# Patient Record
Sex: MALE | Race: BLACK OR AFRICAN AMERICAN | Employment: FULL TIME | URBAN - METROPOLITAN AREA
[De-identification: names, ages, dates, MRNs, and addresses within clinical notes are randomized per-mention and may not be internally consistent; named-entity substitution may affect disease eponyms.]

---

## 2018-09-24 ENCOUNTER — HOSPITAL ENCOUNTER (OUTPATIENT)
Dept: PREADMISSION TESTING | Age: 58
Discharge: HOME | End: 2018-09-24
Payer: COMMERCIAL

## 2018-09-24 VITALS — HEIGHT: 68 IN | BODY MASS INDEX: 25.01 KG/M2 | WEIGHT: 165 LBS

## 2018-09-24 LAB
ANION GAP SERPL CALC-SCNC: 5 MMOL/L (ref 3–18)
ATRIAL RATE: 55 BPM
BUN SERPL-MCNC: 13 MG/DL (ref 7–18)
BUN/CREAT SERPL: 13 (ref 12–20)
CALCIUM SERPL-MCNC: 9.3 MG/DL (ref 8.5–10.1)
CALCULATED P AXIS, ECG09: 66 DEGREES
CALCULATED R AXIS, ECG10: 79 DEGREES
CALCULATED T AXIS, ECG11: 61 DEGREES
CHLORIDE SERPL-SCNC: 103 MMOL/L (ref 100–108)
CO2 SERPL-SCNC: 33 MMOL/L (ref 21–32)
CREAT SERPL-MCNC: 1 MG/DL (ref 0.6–1.3)
DIAGNOSIS, 93000: NORMAL
GLUCOSE SERPL-MCNC: 88 MG/DL (ref 74–99)
HCT VFR BLD AUTO: 43.1 % (ref 36–48)
HGB BLD-MCNC: 13.4 G/DL (ref 13–16)
P-R INTERVAL, ECG05: 160 MS
POTASSIUM SERPL-SCNC: 4.4 MMOL/L (ref 3.5–5.5)
Q-T INTERVAL, ECG07: 416 MS
QRS DURATION, ECG06: 86 MS
QTC CALCULATION (BEZET), ECG08: 397 MS
SODIUM SERPL-SCNC: 141 MMOL/L (ref 136–145)
VENTRICULAR RATE, ECG03: 55 BPM

## 2018-09-24 PROCEDURE — 80048 BASIC METABOLIC PNL TOTAL CA: CPT | Performed by: UROLOGY

## 2018-09-24 PROCEDURE — 93005 ELECTROCARDIOGRAM TRACING: CPT

## 2018-09-24 PROCEDURE — 85018 HEMOGLOBIN: CPT | Performed by: UROLOGY

## 2018-09-24 RX ORDER — LISINOPRIL 20 MG/1
20 TABLET ORAL DAILY
COMMUNITY

## 2018-09-24 RX ORDER — CHOLECALCIFEROL TAB 125 MCG (5000 UNIT) 125 MCG
5000 TAB ORAL DAILY
COMMUNITY

## 2018-09-24 RX ORDER — CIPROFLOXACIN 2 MG/ML
400 INJECTION, SOLUTION INTRAVENOUS ONCE
Status: CANCELLED | OUTPATIENT
Start: 2018-09-24 | End: 2018-09-24

## 2018-09-24 RX ORDER — SODIUM CHLORIDE, SODIUM LACTATE, POTASSIUM CHLORIDE, CALCIUM CHLORIDE 600; 310; 30; 20 MG/100ML; MG/100ML; MG/100ML; MG/100ML
125 INJECTION, SOLUTION INTRAVENOUS CONTINUOUS
Status: CANCELLED | OUTPATIENT
Start: 2018-09-24

## 2018-09-24 RX ORDER — ATORVASTATIN CALCIUM 10 MG/1
10 TABLET, FILM COATED ORAL DAILY
COMMUNITY

## 2018-09-24 RX ORDER — AMLODIPINE BESYLATE 10 MG/1
10 TABLET ORAL DAILY
COMMUNITY

## 2018-09-24 RX ORDER — CARVEDILOL 25 MG/1
25 TABLET ORAL 2 TIMES DAILY WITH MEALS
COMMUNITY

## 2018-10-26 ENCOUNTER — HOSPITAL ENCOUNTER (OUTPATIENT)
Dept: PREADMISSION TESTING | Age: 58
Discharge: HOME | End: 2018-10-26
Payer: COMMERCIAL

## 2018-10-26 LAB
ANION GAP SERPL CALC-SCNC: 7 MMOL/L (ref 3–18)
BUN SERPL-MCNC: 13 MG/DL (ref 7–18)
BUN/CREAT SERPL: 13
CALCIUM SERPL-MCNC: 8.9 MG/DL (ref 8.5–10.1)
CHLORIDE SERPL-SCNC: 105 MMOL/L (ref 100–108)
CO2 SERPL-SCNC: 31 MMOL/L (ref 21–32)
CREAT SERPL-MCNC: 0.97 MG/DL (ref 0.6–1.3)
GLUCOSE SERPL-MCNC: 90 MG/DL (ref 74–99)
HCT VFR BLD AUTO: 43.3 % (ref 36–48)
HGB BLD-MCNC: 13.6 G/DL (ref 13–16)
POTASSIUM SERPL-SCNC: 4 MMOL/L (ref 3.5–5.5)
SODIUM SERPL-SCNC: 143 MMOL/L (ref 136–145)

## 2018-10-26 PROCEDURE — 80048 BASIC METABOLIC PNL TOTAL CA: CPT | Performed by: ANESTHESIOLOGY

## 2018-10-26 PROCEDURE — 36415 COLL VENOUS BLD VENIPUNCTURE: CPT | Performed by: ANESTHESIOLOGY

## 2018-10-26 PROCEDURE — 85018 HEMOGLOBIN: CPT | Performed by: ANESTHESIOLOGY

## 2018-10-29 PROBLEM — R97.20 ELEVATED PSA: Status: ACTIVE | Noted: 2018-10-29

## 2018-10-30 ENCOUNTER — ANESTHESIA (OUTPATIENT)
Dept: SURGERY | Age: 58
End: 2018-10-30
Payer: COMMERCIAL

## 2018-10-30 ENCOUNTER — ANESTHESIA EVENT (OUTPATIENT)
Dept: SURGERY | Age: 58
End: 2018-10-30
Payer: COMMERCIAL

## 2018-10-30 ENCOUNTER — HOSPITAL ENCOUNTER (OUTPATIENT)
Dept: ULTRASOUND IMAGING | Age: 58
Discharge: HOME | End: 2018-10-30
Attending: UROLOGY
Payer: COMMERCIAL

## 2018-10-30 ENCOUNTER — HOSPITAL ENCOUNTER (OUTPATIENT)
Age: 58
Setting detail: OUTPATIENT SURGERY
Discharge: HOME | End: 2018-10-30
Attending: UROLOGY | Admitting: UROLOGY
Payer: COMMERCIAL

## 2018-10-30 VITALS
DIASTOLIC BLOOD PRESSURE: 72 MMHG | BODY MASS INDEX: 24.32 KG/M2 | WEIGHT: 160.5 LBS | SYSTOLIC BLOOD PRESSURE: 133 MMHG | HEART RATE: 74 BPM | RESPIRATION RATE: 16 BRPM | OXYGEN SATURATION: 100 % | TEMPERATURE: 97.3 F | HEIGHT: 68 IN

## 2018-10-30 DIAGNOSIS — R97.20 ELEVATED PSA: ICD-10-CM

## 2018-10-30 PROCEDURE — 77030003481 HC NDL BIOP GUN BARD -B: Performed by: UROLOGY

## 2018-10-30 PROCEDURE — 74011000250 HC RX REV CODE- 250

## 2018-10-30 PROCEDURE — 76010000154 HC OR TIME FIRST 0.5 HR: Performed by: UROLOGY

## 2018-10-30 PROCEDURE — 77030013688 US GUIDE NDL PLC

## 2018-10-30 PROCEDURE — 77030020782 HC GWN BAIR PAWS FLX 3M -B: Performed by: UROLOGY

## 2018-10-30 PROCEDURE — 74011250636 HC RX REV CODE- 250/636: Performed by: UROLOGY

## 2018-10-30 PROCEDURE — 77030018836 HC SOL IRR NACL ICUM -A: Performed by: UROLOGY

## 2018-10-30 PROCEDURE — 76210000021 HC REC RM PH II 0.5 TO 1 HR: Performed by: UROLOGY

## 2018-10-30 PROCEDURE — 74011250636 HC RX REV CODE- 250/636

## 2018-10-30 PROCEDURE — 76060000031 HC ANESTHESIA FIRST 0.5 HR: Performed by: UROLOGY

## 2018-10-30 PROCEDURE — G0416 PROSTATE BIOPSY, ANY MTHD: HCPCS | Performed by: UROLOGY

## 2018-10-30 PROCEDURE — 74011250637 HC RX REV CODE- 250/637: Performed by: ANESTHESIOLOGY

## 2018-10-30 PROCEDURE — 77030032490 HC SLV COMPR SCD KNE COVD -B: Performed by: UROLOGY

## 2018-10-30 RX ORDER — PROPOFOL 10 MG/ML
INJECTION, EMULSION INTRAVENOUS AS NEEDED
Status: DISCONTINUED | OUTPATIENT
Start: 2018-10-30 | End: 2018-10-30 | Stop reason: HOSPADM

## 2018-10-30 RX ORDER — LIDOCAINE HYDROCHLORIDE 20 MG/ML
INJECTION, SOLUTION EPIDURAL; INFILTRATION; INTRACAUDAL; PERINEURAL AS NEEDED
Status: DISCONTINUED | OUTPATIENT
Start: 2018-10-30 | End: 2018-10-30 | Stop reason: HOSPADM

## 2018-10-30 RX ORDER — CIPROFLOXACIN 2 MG/ML
400 INJECTION, SOLUTION INTRAVENOUS ONCE
Status: DISCONTINUED | OUTPATIENT
Start: 2018-10-30 | End: 2018-10-30

## 2018-10-30 RX ORDER — KETAMINE HYDROCHLORIDE 10 MG/ML
INJECTION, SOLUTION INTRAMUSCULAR; INTRAVENOUS AS NEEDED
Status: DISCONTINUED | OUTPATIENT
Start: 2018-10-30 | End: 2018-10-30 | Stop reason: HOSPADM

## 2018-10-30 RX ORDER — CARVEDILOL 12.5 MG/1
25 TABLET ORAL ONCE
Status: COMPLETED | OUTPATIENT
Start: 2018-10-30 | End: 2018-10-30

## 2018-10-30 RX ORDER — ONDANSETRON 2 MG/ML
INJECTION INTRAMUSCULAR; INTRAVENOUS AS NEEDED
Status: DISCONTINUED | OUTPATIENT
Start: 2018-10-30 | End: 2018-10-30 | Stop reason: HOSPADM

## 2018-10-30 RX ORDER — CIPROFLOXACIN 2 MG/ML
400 INJECTION, SOLUTION INTRAVENOUS ONCE
Status: COMPLETED | OUTPATIENT
Start: 2018-10-30 | End: 2018-10-30

## 2018-10-30 RX ORDER — SODIUM CHLORIDE, SODIUM LACTATE, POTASSIUM CHLORIDE, CALCIUM CHLORIDE 600; 310; 30; 20 MG/100ML; MG/100ML; MG/100ML; MG/100ML
125 INJECTION, SOLUTION INTRAVENOUS CONTINUOUS
Status: DISCONTINUED | OUTPATIENT
Start: 2018-10-30 | End: 2018-10-30 | Stop reason: HOSPADM

## 2018-10-30 RX ADMIN — CARVEDILOL 25 MG: 12.5 TABLET, FILM COATED ORAL at 11:01

## 2018-10-30 RX ADMIN — SODIUM CHLORIDE, SODIUM LACTATE, POTASSIUM CHLORIDE, AND CALCIUM CHLORIDE 125 ML/HR: 600; 310; 30; 20 INJECTION, SOLUTION INTRAVENOUS at 09:40

## 2018-10-30 RX ADMIN — LIDOCAINE HYDROCHLORIDE 80 MG: 20 INJECTION, SOLUTION EPIDURAL; INFILTRATION; INTRACAUDAL; PERINEURAL at 12:01

## 2018-10-30 RX ADMIN — PROPOFOL 50 MG: 10 INJECTION, EMULSION INTRAVENOUS at 12:01

## 2018-10-30 RX ADMIN — ONDANSETRON 4 MG: 2 INJECTION INTRAMUSCULAR; INTRAVENOUS at 12:01

## 2018-10-30 RX ADMIN — KETAMINE HYDROCHLORIDE 30 MG: 10 INJECTION, SOLUTION INTRAMUSCULAR; INTRAVENOUS at 12:01

## 2018-10-30 RX ADMIN — SODIUM CHLORIDE, SODIUM LACTATE, POTASSIUM CHLORIDE, AND CALCIUM CHLORIDE: 600; 310; 30; 20 INJECTION, SOLUTION INTRAVENOUS at 11:51

## 2018-10-30 RX ADMIN — CIPROFLOXACIN 400 MG: 2 INJECTION INTRAVENOUS at 11:56

## 2018-10-30 NOTE — OP NOTES
PREOPERATIVE DIAGNOSIS:  Elevated prostate-specific antigen. POSTOPERATIVE DIAGNOSIS:  Elevated prostate-specific antigen. PROCEDURES PERFORMED:  Transrectal ultrasound, ultrasound needle guidance,  and prostate biopsy. SURGEON:  MICHAEL Delgadillo:  None      ANESTHESIA:  MAC      ESTIMATED BLOOD LOSS:  Minimal.      SPECIMENS REMOVED:  Right apex, right base, right mid, left apex, left  base, and left mid of prostate. FINDINGS:  Very heterogeneous prostate. There was a 75.25 mL in size prostate. COMPLICATIONS:  None. IMPLANTS:  None. CLINICAL NOTE:  The patient is a very pleasant 59year-old gentleman with an elevated PSA. DESCRIPTION OF PROCEDURE:  Preoperatively, the risks and benefits of  surgery were described to the patient. The risks include, but are not  limited to, bleeding, infection, and possible need for a future procedure. The patient assumed the risks and signed the informed consent. The patient  was taken to the operating room and placed on the OR table in supine  position. He was administered a MAC anesthetic. He was administered  intravenous antibiotics. He was placed in dorsolithotomy position. A  transrectal ultrasound probe was then inserted transanally without  difficulty into the rectum. Ultrasound pictures were taken of the prostate  in transverse and longitudinal views. The prostate was measured using the  standard Eliptoid formula and was found to be 75.25 mL in size. The  ultrasound views were obtained of the seminal vesicles, which were normal;  the transition zone, which was normal; the right periprosthetic tissue,  which was normal; and the left periprosthetic tissue, which was normal.  The left apex, mid and base, showed no hypoechoic lesions or areas of  abnormality. At this point, using ultrasound guidance, biopsies were taken. At the left base, 2 biopsies were taken. At the left mid 2 biopsies were taken.    At the left apex 2 were taken. Then, 2 biopsies were taken at the  right base, 2 biopsies were taken at the right mid, and 2 biopsies  taken at the right apex. At this point, the ultrasound probe was removed. There was no significant bleeding. The patient was taken out of lithotomy  position, revived from anesthesia, and taken to recovery in stable condition.       Enmanuel Morley MD

## 2018-10-30 NOTE — H&P
Urology [address]  [address] Tel: [phone] Fax: [phone] Patient: Caity Velazquez YOB: 1960 Assessment/Plan # Detail Type Description 1. Assessment Elevated prostate specific antigen [PSA] (R97.20). Patient Plan He has a high and rising PSA. We again discussed that this could represent a prostate cancer and that, by definition he could have a intermediate risk cancer b/c his PSA is over 10. We discussed the only way to know is with prostate bx. He will proceed. Risk of bleeding, infection and possible need for future bx were discussed. He elects to do it in the OR. Rx for Cipro was given. This 59year old male presents for Elevated PSA. History of Present Illness: 1. Elevated PSA The onset was approximately 2 years ago. The patient's PSA has risen since last monitored. Severity level is described as moderate. Reviewed today was a PSA taken on 11/04/2016 with findings of 5.72 ng/mL. PSA taken on 08/16/2016 with findings of 11.4 ng/mL. PSA taken on 01/27/2017 with findings of 6.47 ng/mL. PSA taken on 09/04/2018 with findings of 13.3 ng/mL. Pertinent history includes age over 48. Associated symptoms include slow stream and urinary frequency. Pertinent negatives include dysuria, hematospermia, hematuria, incomplete emptying, urinary incontinence and nocturia. Additional information: no recent UTI, the patient is sexually active,  He has some mld urinary frequency that is unchanged. He has eliminated all confounding variables on the last 3 draws. he was supposed to have a biopsy in 2017 and but cancelled his appt. Gloria Presley PAST MEDICAL/SURGICAL HISTORY   (Detailed) Disease/disorder Onset Date Management Date Comments Insomnia HLD - Hyperlipidaemia Vitamin D deficiency Elevated PSA Hypertension PROBLEM LIST:   Problem List reviewed. Problem Description Onset Date Chronic Clinical Status Notes Benign hypertension 09/04/2018 Y Hypertension 04/06/2018 N Elevated PSA 04/06/2018 N Hyperlipidemia 04/06/2018 N Vitamin D deficiency 04/06/2018 N Medications (active prior to today) Medication Instructions Start Date Stop Date Refilled Elsewhere  
simvastatin 40 mg tablet TAKE 1 TABLET BY ORAL ROUTE EVERY DAY IN THE EVENING 06/25/2018 06/25/2018 N  
carvedilol 25 mg tablet TAKE 1 TABLET BY ORAL ROUTE 2 TIMES EVERY DAY WITH FOOD 07/26/2018 07/26/2018 N AMLODIPINE BESYLATE 10MG TABLETS TAKE 1 TABLET BY MOUTH EVERY DAY 07/26/2018 07/26/2018 N  
lisinopril 20 mg tablet TAKE 1 TABLET BY ORAL ROUTE EVERY DAY 08/22/2018 08/22/2018 N Vitamin D3 5,000 unit tablet take 1 tablet by oral route  every day for 30 days 09/04/2018 10/03/2018 09/04/2018 N Patient Status Completed with information received for patient in a summary of care record. Medication Reconciliation Medications reconciled today. Medication Reviewed Adherence Medication Name Sig Desc Elsewhere Status  
taking as directed carvedilol 25 mg tablet TAKE 1 TABLET BY ORAL ROUTE 2 TIMES EVERY DAY WITH FOOD N Verified  
taking as directed AMLODIPINE BESYLATE 10MG TABLETS TAKE 1 TABLET BY MOUTH EVERY DAY N Verified  
taking as directed simvastatin 40 mg tablet TAKE 1 TABLET BY ORAL ROUTE EVERY DAY IN THE EVENING N Verified  
taking as directed Vitamin D3 5,000 unit tablet take 1 tablet by oral route  every day for 30 days N Verified  
taking as directed lisinopril 20 mg tablet TAKE 1 TABLET BY ORAL ROUTE EVERY DAY N Verified Allergies Ingredient Reaction (Severity) Medication Name Comment NO KNOWN ALLERGIES Reviewed, no changes. Family History  (Detailed) Social History:  (Detailed) Tobacco use reviewed. Preferred language is [place]. MARITAL STATUS/FAMILY/SOCIAL SUPPORT Currently single. Smoking status: Never smoker. SMOKING STATUS Use Status Type Smoking Status Usage Per Day Years Used Total Pack Years  
no/never  Never smoker Review of Systems System Neg/Pos Details Constitutional Negative Chills and Fever. ENMT Negative Ear infections and Sore throat. Eyes Negative Blurred vision, Double vision and Eye pain. Respiratory Negative Asthma, Chronic cough, Dyspnea and Wheezing. Cardio Negative Chest pain. GI Negative Constipation, Decreased appetite, Diarrhea, Nausea and Vomiting.  Positive Slow stream, Urinary frequency.  Negative Dysuria, Hematuria, Incomplete emptying, Nocturia and Urinary incontinence. Endocrine Negative Cold intolerance, Heat intolerance, Increased thirst and Weight loss. Neuro Negative Headache and Tremors. Psych Negative Anxiety and Depression. Integumentary Negative Itching skin and Rash. MS Negative Back pain and Joint pain. Hema/Lymph Negative Easy bleeding. Reproductive Negative Hematospermia and Sexual dysfunction. Vital Signs Height Time ft in cm Last Measured Height Position 8:08 AM 5.0 9.00 175.26 09/07/2016 Standing Weight/BSA/BMI Time lb oz kg Context BMI kg/m2 BSA m2  
8:08 .00  72.575  23.63 Measured By Time Measured by  
8:08 AM Yanira Mcgee Physical Exam 
Exam Findings Details Constitutional Normal Well developed. Neck Exam Normal Inspection - Normal.  
Respiratory Normal Inspection - Normal.  
Abdomen Normal No abdominal tenderness. Genitourinary Normal Penis - Normal. Urethral meatus - Normal. Scrotum - Normal. Epididymides - Normal. Lymph nodes - Normal. Testes - Normal. No CVA tenderness. No suprapubic tenderness. Rectal Normal Anus - Normal. Sphincter - Normal. Prostate - Normal.  
Extremity Normal No edema. Psychiatric Normal Orientation - Oriented to time, place, person & situation. Appropriate mood and affect. Patient Education # Patient Education 1. Prostate-Specific Antigen (PSA) Test:~  
 
Medications (added, continued, or stopped today) Start Date Medication Directions PRN Status PRN Reason Instruction Stop Date  
07/26/2018 AMLODIPINE BESYLATE 10MG TABLETS TAKE 1 TABLET BY MOUTH EVERY DAY N     
07/26/2018 carvedilol 25 mg tablet TAKE 1 TABLET BY ORAL ROUTE 2 TIMES EVERY DAY WITH FOOD N     
09/10/2018 Cipro 500 mg tablet take 1 by oral route once for 12 hours start day prior to biopsy N  Start taking on the day prior to the biopsy 08/22/2018 lisinopril 20 mg tablet TAKE 1 TABLET BY ORAL ROUTE EVERY DAY N     
06/25/2018 simvastatin 40 mg tablet TAKE 1 TABLET BY ORAL ROUTE EVERY DAY IN THE EVENING N     
09/04/2018 Vitamin D3 5,000 unit tablet take 1 tablet by oral route  every day for 30 days N   10/03/2018 Active Patient Care Team Members Name Contact Agency Type Support Role Relationship Active Date Inactive Date Specialty Bran Yarbrough   encounter provider    Urology Ifeoma Solomon   Patient provider PCP   [facility] Ifeoma Solomon   primary care provider Provider:  
 
 
Carlos García MD

## 2018-10-30 NOTE — INTERVAL H&P NOTE
H&P Update: 
Tram Valdez was seen and examined. History and physical has been reviewed. The patient has been examined.  There have been no significant clinical changes since the completion of the originally dated History and Physical. 
 
Signed By: Karlene Aden MD   
 October 30, 2018 11:43 AM

## 2018-10-30 NOTE — DISCHARGE INSTRUCTIONS
DISCHARGE SUMMARY from Nurse    PATIENT INSTRUCTIONS:    After general anesthesia or intravenous sedation, for 24 hours or while taking prescription Narcotics:  · Limit your activities  · Do not drive and operate hazardous machinery  · Do not make important personal or business decisions  · Do  not drink alcoholic beverages  · If you have not urinated within 8 hours after discharge, please contact your surgeon on call. Report the following to your surgeon:  · Excessive pain, swelling, redness or odor of or around the surgical area  · Temperature over 100.5  · Nausea and vomiting lasting longer than 4 hours or if unable to take medications  · Any signs of decreased circulation or nerve impairment to extremity: change in color, persistent  numbness, tingling, coldness or increase pain  · Any questions    What to do at Home:  REGULAR DIET [address] OF RECTAL BLEEDING OR DRAINAGE CAN BE EXPECTED  NO HEAVY LIFTING TODAY  DR Jose Overton WILL CALL REGARDING A POST OP CHECK UP    If you experience any of the following symptoms heavy bleeding, unable to void, fevers, severe pain, please follow up with dr Cortney Williamson    *  Please give a list of your current medications to your Primary Care Provider. *  Please update this list whenever your medications are discontinued, doses are      changed, or new medications (including over-the-counter products) are added. *  Please carry medication information at all times in case of emergency situations. These are general instructions for a healthy lifestyle:    No smoking/ No tobacco products/ Avoid exposure to second hand smoke  Surgeon General's Warning:  Quitting smoking now greatly reduces serious risk to your health.     Obesity, smoking, and sedentary lifestyle greatly increases your risk for illness    A healthy diet, regular physical exercise & weight monitoring are important for maintaining a healthy lifestyle    You may be retaining fluid if you have a history of heart failure or if you experience any of the following symptoms:  Weight gain of 3 pounds or more overnight or 5 pounds in a week, increased swelling in our hands or feet or shortness of breath while lying flat in bed. Please call your doctor as soon as you notice any of these symptoms; do not wait until your next office visit. Recognize signs and symptoms of STROKE:    F-face looks uneven    A-arms unable to move or move unevenly    S-speech slurred or non-existent    T-time-call 911 as soon as signs and symptoms begin-DO NOT go       Back to bed or wait to see if you get better-TIME IS BRAIN. Warning Signs of HEART ATTACK     Call 911 if you have these symptoms:   Chest discomfort. Most heart attacks involve discomfort in the center of the chest that lasts more than a few minutes, or that goes away and comes back. It can feel like uncomfortable pressure, squeezing, fullness, or pain.  Discomfort in other areas of the upper body. Symptoms can include pain or discomfort in one or both arms, the back, neck, jaw, or stomach.  Shortness of breath with or without chest discomfort.  Other signs may include breaking out in a cold sweat, nausea, or lightheadedness. Don't wait more than five minutes to call 911 - MINUTES MATTER! Fast action can save your life. Calling 911 is almost always the fastest way to get lifesaving treatment. Emergency Medical Services staff can begin treatment when they arrive -- up to an hour sooner than if someone gets to the hospital by car. The discharge information has been reviewed with the patient and caregiver. The patient and caregiver verbalized understanding.   Discharge medications reviewed with the patient and caregiver and appropriate educational materials and side effects teaching were provided.   ___________________________________________________________________________________________________________________________________    Patient armband removed and shredded

## 2018-10-30 NOTE — PERIOP NOTES
Reviewed PTA medication list with patient/caregiver and patient/caregiver denies any additional medications. Patient admits to having a responsible adult care for them for at least 24 hours after surgery. 
  
Dual  skin assessment performed with GRACIELA Pendleton.

## 2018-11-05 NOTE — ANESTHESIA POSTPROCEDURE EVALUATION
Procedure(s): 
TRANSRECTAL ULTRASOUND GUIDED PROSTATE BIOPSY . Anesthesia Post Evaluation Multimodal analgesia: multimodal analgesia used between 6 hours prior to anesthesia start to PACU discharge Patient location during evaluation: PACU Patient participation: complete - patient participated Level of consciousness: awake Pain score: 0 Pain management: satisfactory to patient Airway patency: patent Anesthetic complications: no 
Cardiovascular status: acceptable Respiratory status: acceptable Hydration status: acceptable Visit Vitals /72 Pulse 74 Temp 36.3 °C (97.3 °F) Resp 16 Ht 5' 8\" (1.727 m) Wt 72.8 kg (160 lb 8 oz) SpO2 100% BMI 24.40 kg/m²

## 2018-11-05 NOTE — ANESTHESIA PREPROCEDURE EVALUATION
Anesthetic History No history of anesthetic complications Review of Systems / Medical History Patient summary reviewed and pertinent labs reviewed Pulmonary Within defined limits Neuro/Psych Within defined limits Cardiovascular Hypertension: well controlled Exercise tolerance: >4 METS 
  
GI/Hepatic/Renal 
Within defined limits Endo/Other Within defined limits Other Findings Physical Exam 
 
Airway Mallampati: II 
TM Distance: 4 - 6 cm Neck ROM: normal range of motion Mouth opening: Normal 
 
 Cardiovascular Rhythm: regular Rate: normal 
 
 
 
 Dental 
No notable dental hx Pulmonary Breath sounds clear to auscultation Abdominal 
Abdominal exam normal 
 
 
 Other Findings Anesthetic Plan ASA: 2 Anesthesia type: MAC Induction: Intravenous Anesthetic plan and risks discussed with: Patient

## (undated) DEVICE — SPONGE GZ W4XL4IN COT 12 PLY TYP VII WVN C FLD DSGN

## (undated) DEVICE — MAX-CORE® DISPOSABLE CORE BIOPSY INSTRUMENT, 18G X 25CM: Brand: MAX-CORE

## (undated) DEVICE — DEVON™ KNEE AND BODY STRAP 60" X 3" (1.5 M X 7.6 CM): Brand: DEVON

## (undated) DEVICE — SKIN MARKER,REGULAR TIP WITH RULER AND LABELS: Brand: DEVON

## (undated) DEVICE — PAD,NON-ADHERENT,3X8,STERILE,LF,1/PK: Brand: MEDLINE

## (undated) DEVICE — KENDALL SCD EXPRESS SLEEVES, KNEE LENGTH, MEDIUM: Brand: KENDALL SCD

## (undated) DEVICE — DRAPE TWL SURG 16X26IN BLU ORB04] ALLCARE INC]

## (undated) DEVICE — SOL IRRIGATION INJ NACL 0.9% 500ML BTL